# Patient Record
Sex: FEMALE | URBAN - METROPOLITAN AREA
[De-identification: names, ages, dates, MRNs, and addresses within clinical notes are randomized per-mention and may not be internally consistent; named-entity substitution may affect disease eponyms.]

---

## 2021-08-13 ENCOUNTER — HISTORICAL (OUTPATIENT)
Dept: ADMINISTRATIVE | Facility: HOSPITAL | Age: 66
End: 2021-08-13

## 2021-08-16 LAB — FINAL CULTURE: NORMAL

## 2021-08-19 LAB
FINAL CULTURE: NORMAL
FINAL CULTURE: NORMAL

## 2025-01-28 DIAGNOSIS — I73.9 PAD (PERIPHERAL ARTERY DISEASE): Primary | ICD-10-CM

## 2025-01-30 RX ORDER — NITROGLYCERIN 20 MG/G
OINTMENT TOPICAL
COMMUNITY
Start: 2025-01-27

## 2025-01-30 RX ORDER — CARVEDILOL 25 MG/1
25 TABLET ORAL EVERY 12 HOURS
COMMUNITY
Start: 2024-12-23

## 2025-01-30 RX ORDER — CHLORTHALIDONE 25 MG/1
50 TABLET ORAL
COMMUNITY
Start: 2024-12-23

## 2025-01-30 RX ORDER — LOSARTAN POTASSIUM 100 MG/1
100 TABLET ORAL
COMMUNITY
Start: 2024-12-23

## 2025-01-30 RX ORDER — FUROSEMIDE 20 MG/1
20 TABLET ORAL DAILY PRN
COMMUNITY
Start: 2024-08-28

## 2025-01-30 RX ORDER — ALBUTEROL SULFATE 0.83 MG/ML
SOLUTION RESPIRATORY (INHALATION)
COMMUNITY
Start: 2024-12-05

## 2025-01-30 RX ORDER — HYDROCODONE BITARTRATE AND ACETAMINOPHEN 5; 325 MG/1; MG/1
1 TABLET ORAL EVERY 8 HOURS PRN
COMMUNITY
Start: 2025-01-27

## 2025-01-30 RX ORDER — SULFAMETHOXAZOLE AND TRIMETHOPRIM 800; 160 MG/1; MG/1
1 TABLET ORAL EVERY 12 HOURS
COMMUNITY
Start: 2024-11-07

## 2025-01-30 RX ORDER — AMLODIPINE BESYLATE 10 MG/1
TABLET ORAL
COMMUNITY
Start: 2024-12-23

## 2025-01-30 RX ORDER — ATORVASTATIN CALCIUM 80 MG/1
80 TABLET, FILM COATED ORAL
COMMUNITY
Start: 2024-12-23

## 2025-02-10 NOTE — PROGRESS NOTES
[facility] V[facility] - Clinic Note  Andrea Henry MD      Patient Name: Cher Agrawal                   : 1955      MRN: [id number]   Visit Date: 2025       History Present Illness     Reason for Visit: Arterial Disease    Ms. Agrawal presents to the clinic for evaluation of bilateral lower extremity arterial disease.  She is a pleasant 70-year-old female referred over by Dr. Corona for discoloration and pain in her feet.  The she reports over the last 3 months she has had intermittent episodes of bluish to blackish discoloration of her toes with pain.  She has a began on the left and resolved.  She is now present on the right.  She has seen her cardiologist Dr. Humphrey and had ABIs that were normal.  He did give her some nitroglycerin paste, however this is not had any improvement.  She does smoke about a half a pack per day.  She is on aspirin and a statin.      REVIEW OF SYSTEMS:  12 point review of systems conducted, negative except as stated in the history of present illness. See HPI for details.        Physical Exam      Vitals:    25 1001 25 1005   BP: 129/78 (!) 159/85   BP Location: Right arm Left arm   Pulse:  92   Weight: 68 kg (150 lb)    Height: 5' (1.524 m)           General: well-nourished, no acute distress, and healthy appearing, alert, pleasant, conversant, and oriented  Neurologic: cranial nerves are grossly intact, no neurologic deficits, no motor deficits, and no sensory deficits  Neck/Chest: normal , soft without lymphadenopathy, and no carotid bruits noted  Respiratory: breathing easily and without respiratory distress  Cardiology: regular rate and rhythm    Upper Extremity Arterial Exam:   Right - brachial is palpable  Left - brachial is palpable    Lower Extremity Arterial Exam:  Right - posterior tibial is biphasic with doppler and dorsalis pedis is biphasic with doppler  Left - posterior tibial is triphasic with doppler and dorsalis pedis is triphasic with  doppler    Musculoskeletal:   Upper Extremity: normal bilateral hand function  Lower Extremity: no edema present to bilateral lower extremities     Skin:           Significant be discoloration of the right 1st and 2nd toes.  No open ulceration present.      Assessment and Plan     Ms. Agrawal is a 70 y.o. female with probable blue toe syndrome.  I did discuss with her the importance of smoking cessation.  I am concerned she may be embolizing from plaque more proximally since this is occurring bilaterally.  I would like to obtain a CTA of the abdomen and pelvis with runoff.  I also instructed her to begin taking Plavix.  She will return after her CTA.       1. PAD (peripheral artery disease)  - Ambulatory referral/consult to Vascular Surgery  - clopidogreL (PLAVIX) 75 mg tablet; Take 1 tablet (75 mg total) by mouth once daily.  Dispense: 30 tablet; Refill: 11    2. Smoker    3. 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      Medical History     Past Medical History:   Diagnosis Date    Heart attack     HLD (hyperlipidemia)     HTN (hypertension)     Stroke      Past Surgical History:   Procedure Laterality Date    ANKLE FRACTURE SURGERY Left     APPENDECTOMY      HYSTERECTOMY      INSERTION, STENT, ARTERY      cardiac stent    TONSILLECTOMY AND ADENOIDECTOMY       No family history on file.  Social History     Socioeconomic History    Marital status: Unknown   Tobacco Use    Smoking status: Every Day     Current packs/day: 0.25     Average packs/day: 0.3 packs/day for 40.0 years (10.0 ttl pk-yrs)     Types: Cigarettes     Start date: 2/12/1985    Smokeless tobacco: Never   Substance and Sexual Activity    Alcohol use: Never    Drug use: Never     Current Outpatient Medications   Medication Instructions    albuterol (PROVENTIL) 2.5 mg /3 mL (0.083 %) nebulizer solution USE 1 VIAL IN NEB EVERY 4  HOURS AS NEEDED    amLODIPine (NORVASC) 10 MG tablet Take by mouth.    atorvastatin (LIPITOR) 80 mg    carvediloL (COREG) 25 mg, Every 12 hours    chlorthalidone (HYGROTEN) 50 mg    clopidogreL (PLAVIX) 75 mg, Oral, Daily    furosemide (LASIX) 20 mg, Daily PRN    HYDROcodone-acetaminophen (NORCO) 5-325 mg per tablet 1 tablet, Every 8 hours PRN    losartan (COZAAR) 100 mg    NITRO-BID 2 % ointment Place onto the skin.    sulfamethoxazole-trimethoprim 800-160mg (BACTRIM DS) 800-160 mg Tab 1 tablet, Every 12 hours     Review of patient's allergies indicates:   Allergen Reactions    Gabapentin     Penicillins        Patient Care Team:  Kemal Corona DPM as Consulting Physician (Podiatry)        No follow-ups on file. In addition to their scheduled follow up, the patient has also been instructed to follow up on as needed basis.     Future Appointments   Date Time Provider Department Center   2/12/2025 11:20 AM Andrea Henry MD [facility]   3/5/2025 10:40 AM Andrea Henry MD [facility]

## 2025-02-12 ENCOUNTER — OFFICE VISIT (OUTPATIENT)
Dept: VASCULAR SURGERY | Facility: CLINIC | Age: 70
End: 2025-02-12
Payer: MEDICARE

## 2025-02-12 VITALS
BODY MASS INDEX: 29.45 KG/M2 | SYSTOLIC BLOOD PRESSURE: 159 MMHG | HEIGHT: 60 IN | WEIGHT: 150 LBS | HEART RATE: 92 BPM | DIASTOLIC BLOOD PRESSURE: 85 MMHG

## 2025-02-12 DIAGNOSIS — I73.9 PAD (PERIPHERAL ARTERY DISEASE): ICD-10-CM

## 2025-02-12 DIAGNOSIS — I75.023 BLUE TOE SYNDROME OF BOTH LOWER EXTREMITIES: ICD-10-CM

## 2025-02-12 DIAGNOSIS — F17.200 SMOKER: Primary | ICD-10-CM

## 2025-02-12 PROCEDURE — 3079F DIAST BP 80-89 MM HG: CPT | Mod: CPTII,,, | Performed by: SURGERY

## 2025-02-12 PROCEDURE — 3074F SYST BP LT 130 MM HG: CPT | Mod: CPTII,,, | Performed by: SURGERY

## 2025-02-12 PROCEDURE — 1101F PT FALLS ASSESS-DOCD LE1/YR: CPT | Mod: CPTII,,, | Performed by: SURGERY

## 2025-02-12 PROCEDURE — 1160F RVW MEDS BY RX/DR IN RCRD: CPT | Mod: CPTII,,, | Performed by: SURGERY

## 2025-02-12 PROCEDURE — 1159F MED LIST DOCD IN RCRD: CPT | Mod: CPTII,,, | Performed by: SURGERY

## 2025-02-12 PROCEDURE — 3288F FALL RISK ASSESSMENT DOCD: CPT | Mod: CPTII,,, | Performed by: SURGERY

## 2025-02-12 PROCEDURE — 3008F BODY MASS INDEX DOCD: CPT | Mod: CPTII,,, | Performed by: SURGERY

## 2025-02-12 PROCEDURE — 99204 OFFICE O/P NEW MOD 45 MIN: CPT | Mod: ,,, | Performed by: SURGERY

## 2025-02-12 PROCEDURE — 4010F ACE/ARB THERAPY RXD/TAKEN: CPT | Mod: CPTII,,, | Performed by: SURGERY

## 2025-02-12 RX ORDER — CLOPIDOGREL BISULFATE 75 MG/1
75 TABLET ORAL DAILY
Qty: 30 TABLET | Refills: 11 | Status: SHIPPED | OUTPATIENT
Start: 2025-02-12 | End: 2026-02-12

## 2025-02-13 DIAGNOSIS — I73.9 PERIPHERAL VASCULAR DISEASE, UNSPECIFIED: Primary | ICD-10-CM

## 2025-03-03 NOTE — PROGRESS NOTES
[facility] Vascular - Clinic Note  Andrea Henry MD      Patient Name: Cher Agrawal                   : 1955      MRN: [id number]   Visit Date: 3/5/2025       History Present Illness     Reason for Visit: Arterial Disease    Ms. Agrawal presents to the clinic to discuss results of CT scan. She reports that the wound to her right great toe has gotten worse. States that the pain to the toe has become unbearable. She is on plavix, aspirin, and statin.  She reports that she recently at her dog step on her to accident and she began to develop drainage afterwards.    REVIEW OF SYSTEMS:  12 point review of systems conducted, negative except as stated in the history of present illness. See HPI for details.        Physical Exam      Vitals:    25 1042 25 1043   BP: (!) 147/86 (!) 150/77   BP Location: Left arm Right arm   Patient Position: Sitting Sitting   Pulse: 73 72   Weight: 68 kg (150 lb)    Height: 5' (1.524 m)           General: well-nourished, no acute distress, and healthy appearing, alert, pleasant, conversant, and oriented  Neurologic: cranial nerves are grossly intact, no neurologic deficits, no motor deficits, and no sensory deficits  Neck/Chest: normal , soft without lymphadenopathy, and no carotid bruits noted  Respiratory: breathing easily, without respiratory distress, and normal breath sounds  Abdomen: normal and soft  Cardiology: regular rate and rhythm and no audible murmur    Upper Extremity Arterial Exam:   Right - radial is palpable and brachial is palpable  Left - radial is palpable and brachial is palpable    Lower Extremity Arterial Exam:  Right - posterior tibial is biphasic with doppler  Left - posterior tibial is biphasic with doppler    Musculoskeletal:   Upper Extremity: normal bilateral hand function  Lower Extremity: no edema present to bilateral lower extremities     Wound:   Location: right great toe  The distal tip of her right great toe is ulcerated.               Assessment and Plan     Ms. Agrawal is a 70 y.o. female with right lower extremity critical limb ischemia.  I did offer her angiography as treatment.  She has a short-segment occlusion of the SFA and I think this is amenable to endovascular therapy.  Isolated the procedure, risks, benefits to her.  We will also call her in some antibiotics until her procedure.    1. 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 Smoker    3. Critical limb ischemia of right lower extremity  - Basic Metabolic Panel; Future  - CBC Auto Differential; Future  - EKG 12-lead; Future  - X-Ray Chest PA And Lateral; Future  - sulfamethoxazole-trimethoprim 800-160mg (BACTRIM DS) 800-160 mg Tab; Take 1 tablet by mouth 2 (two) times daily. for 10 days  Dispense: 20 tablet; Refill: 0          Imaging Obtained/Reviewed   Study: CTA aorta/runoff  Date:   2/26/2025  This shows about a 5-6 cm occlusion of the distal right SFA and proximal popliteal with reconstitution.      Medical History     Past Medical History:   Diagnosis Date    Heart attack     HLD (hyperlipidemia)     HTN (hypertension)     Stroke      Past Surgical History:   Procedure Laterality Date    ANKLE FRACTURE SURGERY Left     APPENDECTOMY      HYSTERECTOMY      INSERTION, STENT, ARTERY      cardiac stent    TONSILLECTOMY AND ADENOIDECTOMY       No family history on file.  Social History[1]  Current Outpatient Medications   Medication Instructions    albuterol (PROVENTIL) 2.5 mg /3 mL (0.083 %) nebulizer solution USE 1 VIAL IN NEB EVERY 4 HOURS AS NEEDED    amLODIPine (NORVASC) 10 MG tablet Take by mouth.    atorvastatin (LIPITOR) 80 mg    benzonatate (TESSALON) 100 mg, 3 times daily PRN    carvediloL (COREG) 25 mg, Every 12 hours    chlorthalidone (HYGROTEN) 50 mg    clopidogreL (PLAVIX) 75 mg, Oral, Daily    furosemide (LASIX) 20 mg, Daily PRN    losartan (COZAAR) 100 mg    NITRO-BID 2 % ointment Place onto the skin.    promethazine (PHENERGAN) 25 mg, Every 4 hours PRN     sulfamethoxazole-trimethoprim 800-160mg (BACTRIM DS) 800-160 mg Tab 1 tablet, Oral, 2 times daily     Review of patient's allergies indicates:   Allergen Reactions    Gabapentin     Penicillins        Patient Care Team:  Cindy Fowler MD as PCP - General (Family Medicine)  Kemal Corona DPM as Consulting Physician (Podiatry)  Andrea Henry MD as Vascular Surgery (Vascular Surgery)        No follow-ups on file. In addition to their scheduled follow up, the patient has also been instructed to follow up on as needed basis.     Future Appointments   Date Time Provider Department Center   3/31/2025  9:00 AM Andrea Henry MD [facility]               [1]   Social History  Socioeconomic History    Marital status:    Tobacco Use    Smoking status: Every Day     Current packs/day: 0.25     Average packs/day: 0.2 packs/day for 40.1 years (10.0 ttl pk-yrs)     Types: Cigarettes     Start date: 2/12/1985    Smokeless tobacco: Never   Substance and Sexual Activity    Alcohol use: Never    Drug use: Never

## 2025-03-03 NOTE — H&P (VIEW-ONLY)
[facility] Vascular - Clinic Note  Andrea Henry MD      Patient Name: Cher Agrawal                   : 1955      MRN: [id number]   Visit Date: 3/5/2025       History Present Illness     Reason for Visit: Arterial Disease    Ms. Agrawal presents to the clinic to discuss results of CT scan. She reports that the wound to her right great toe has gotten worse. States that the pain to the toe has become unbearable. She is on plavix, aspirin, and statin.  She reports that she recently at her dog step on her to accident and she began to develop drainage afterwards.    REVIEW OF SYSTEMS:  12 point review of systems conducted, negative except as stated in the history of present illness. See HPI for details.        Physical Exam      Vitals:    25 1042 25 1043   BP: (!) 147/86 (!) 150/77   BP Location: Left arm Right arm   Patient Position: Sitting Sitting   Pulse: 73 72   Weight: 68 kg (150 lb)    Height: 5' (1.524 m)           General: well-nourished, no acute distress, and healthy appearing, alert, pleasant, conversant, and oriented  Neurologic: cranial nerves are grossly intact, no neurologic deficits, no motor deficits, and no sensory deficits  Neck/Chest: normal , soft without lymphadenopathy, and no carotid bruits noted  Respiratory: breathing easily, without respiratory distress, and normal breath sounds  Abdomen: normal and soft  Cardiology: regular rate and rhythm and no audible murmur    Upper Extremity Arterial Exam:   Right - radial is palpable and brachial is palpable  Left - radial is palpable and brachial is palpable    Lower Extremity Arterial Exam:  Right - posterior tibial is biphasic with doppler  Left - posterior tibial is biphasic with doppler    Musculoskeletal:   Upper Extremity: normal bilateral hand function  Lower Extremity: no edema present to bilateral lower extremities     Wound:   Location: right great toe  The distal tip of her right great toe is ulcerated.               Assessment and Plan     Ms. Agrawal is a 70 y.o. female with right lower extremity critical limb ischemia.  I did offer her angiography as treatment.  She has a short-segment occlusion of the SFA and I think this is amenable to endovascular therapy.  Isolated the procedure, risks, benefits to her.  We will also call her in some antibiotics until her procedure.    1. 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 Smoker    3. Critical limb ischemia of right lower extremity  - Basic Metabolic Panel; Future  - CBC Auto Differential; Future  - EKG 12-lead; Future  - X-Ray Chest PA And Lateral; Future  - sulfamethoxazole-trimethoprim 800-160mg (BACTRIM DS) 800-160 mg Tab; Take 1 tablet by mouth 2 (two) times daily. for 10 days  Dispense: 20 tablet; Refill: 0          Imaging Obtained/Reviewed   Study: CTA aorta/runoff  Date:   2/26/2025  This shows about a 5-6 cm occlusion of the distal right SFA and proximal popliteal with reconstitution.      Medical History     Past Medical History:   Diagnosis Date    Heart attack     HLD (hyperlipidemia)     HTN (hypertension)     Stroke      Past Surgical History:   Procedure Laterality Date    ANKLE FRACTURE SURGERY Left     APPENDECTOMY      HYSTERECTOMY      INSERTION, STENT, ARTERY      cardiac stent    TONSILLECTOMY AND ADENOIDECTOMY       No family history on file.  Social History[1]  Current Outpatient Medications   Medication Instructions    albuterol (PROVENTIL) 2.5 mg /3 mL (0.083 %) nebulizer solution USE 1 VIAL IN NEB EVERY 4 HOURS AS NEEDED    amLODIPine (NORVASC) 10 MG tablet Take by mouth.    atorvastatin (LIPITOR) 80 mg    benzonatate (TESSALON) 100 mg, 3 times daily PRN    carvediloL (COREG) 25 mg, Every 12 hours    chlorthalidone (HYGROTEN) 50 mg    clopidogreL (PLAVIX) 75 mg, Oral, Daily    furosemide (LASIX) 20 mg, Daily PRN    losartan (COZAAR) 100 mg    NITRO-BID 2 % ointment Place onto the skin.    promethazine (PHENERGAN) 25 mg, Every 4 hours PRN     sulfamethoxazole-trimethoprim 800-160mg (BACTRIM DS) 800-160 mg Tab 1 tablet, Oral, 2 times daily     Review of patient's allergies indicates:   Allergen Reactions    Gabapentin     Penicillins        Patient Care Team:  Cindy Fowler MD as PCP - General (Family Medicine)  Kemal Corona DPM as Consulting Physician (Podiatry)  Andrea Henry MD as Vascular Surgery (Vascular Surgery)        No follow-ups on file. In addition to their scheduled follow up, the patient has also been instructed to follow up on as needed basis.     Future Appointments   Date Time Provider Department Center   3/31/2025  9:00 AM Andrea Henry MD [facility]               [1]   Social History  Socioeconomic History    Marital status:    Tobacco Use    Smoking status: Every Day     Current packs/day: 0.25     Average packs/day: 0.2 packs/day for 40.1 years (10.0 ttl pk-yrs)     Types: Cigarettes     Start date: 2/12/1985    Smokeless tobacco: Never   Substance and Sexual Activity    Alcohol use: Never    Drug use: Never

## 2025-03-05 ENCOUNTER — LAB VISIT (OUTPATIENT)
Dept: LAB | Facility: HOSPITAL | Age: 70
End: 2025-03-05
Attending: SURGERY
Payer: MEDICARE

## 2025-03-05 ENCOUNTER — OFFICE VISIT (OUTPATIENT)
Dept: VASCULAR SURGERY | Facility: CLINIC | Age: 70
End: 2025-03-05
Payer: MEDICARE

## 2025-03-05 VITALS
HEART RATE: 72 BPM | HEIGHT: 60 IN | BODY MASS INDEX: 29.45 KG/M2 | DIASTOLIC BLOOD PRESSURE: 77 MMHG | SYSTOLIC BLOOD PRESSURE: 150 MMHG | WEIGHT: 150 LBS

## 2025-03-05 DIAGNOSIS — F17.200 SMOKER: ICD-10-CM

## 2025-03-05 DIAGNOSIS — I70.221 ATHEROSCLEROSIS OF NATIVE ARTERY OF RIGHT LOWER EXTREMITY WITH REST PAIN: ICD-10-CM

## 2025-03-05 DIAGNOSIS — I70.221 CRITICAL LIMB ISCHEMIA OF RIGHT LOWER EXTREMITY: Primary | ICD-10-CM

## 2025-03-05 DIAGNOSIS — I75.023 BLUE TOE SYNDROME OF BOTH LOWER EXTREMITIES: ICD-10-CM

## 2025-03-05 DIAGNOSIS — I70.221 CRITICAL LIMB ISCHEMIA OF RIGHT LOWER EXTREMITY: ICD-10-CM

## 2025-03-05 LAB
ANION GAP SERPL CALC-SCNC: 11 MEQ/L
BUN SERPL-MCNC: <15 MG/DL (ref 9.8–20.1)
CALCIUM SERPL-MCNC: 10.4 MG/DL (ref 8.4–10.2)
CHLORIDE SERPL-SCNC: 109 MMOL/L (ref 98–107)
CO2 SERPL-SCNC: 19 MMOL/L (ref 23–31)
CREAT SERPL-MCNC: 0.75 MG/DL (ref 0.55–1.02)
CREAT/UREA NIT SERPL: <20
GFR SERPLBLD CREATININE-BSD FMLA CKD-EPI: >60 ML/MIN/1.73/M2
GLUCOSE SERPL-MCNC: 95 MG/DL (ref 82–115)
POTASSIUM SERPL-SCNC: 3.3 MMOL/L (ref 3.5–5.1)
SODIUM SERPL-SCNC: 139 MMOL/L (ref 136–145)

## 2025-03-05 PROCEDURE — 36415 COLL VENOUS BLD VENIPUNCTURE: CPT

## 2025-03-05 PROCEDURE — 4010F ACE/ARB THERAPY RXD/TAKEN: CPT | Mod: CPTII,,, | Performed by: SURGERY

## 2025-03-05 PROCEDURE — 3077F SYST BP >= 140 MM HG: CPT | Mod: CPTII,,, | Performed by: SURGERY

## 2025-03-05 PROCEDURE — 1159F MED LIST DOCD IN RCRD: CPT | Mod: CPTII,,, | Performed by: SURGERY

## 2025-03-05 PROCEDURE — 3288F FALL RISK ASSESSMENT DOCD: CPT | Mod: CPTII,,, | Performed by: SURGERY

## 2025-03-05 PROCEDURE — 80048 BASIC METABOLIC PNL TOTAL CA: CPT

## 2025-03-05 PROCEDURE — 1160F RVW MEDS BY RX/DR IN RCRD: CPT | Mod: CPTII,,, | Performed by: SURGERY

## 2025-03-05 PROCEDURE — 3008F BODY MASS INDEX DOCD: CPT | Mod: CPTII,,, | Performed by: SURGERY

## 2025-03-05 PROCEDURE — 1101F PT FALLS ASSESS-DOCD LE1/YR: CPT | Mod: CPTII,,, | Performed by: SURGERY

## 2025-03-05 PROCEDURE — 99214 OFFICE O/P EST MOD 30 MIN: CPT | Mod: ,,, | Performed by: SURGERY

## 2025-03-05 PROCEDURE — 3078F DIAST BP <80 MM HG: CPT | Mod: CPTII,,, | Performed by: SURGERY

## 2025-03-05 RX ORDER — PROMETHAZINE HYDROCHLORIDE 25 MG/1
25 TABLET ORAL EVERY 4 HOURS PRN
COMMUNITY
Start: 2024-12-11

## 2025-03-05 RX ORDER — BENZONATATE 100 MG/1
100 CAPSULE ORAL 3 TIMES DAILY PRN
COMMUNITY
Start: 2024-12-11

## 2025-03-05 RX ORDER — SODIUM CHLORIDE 9 MG/ML
INJECTION, SOLUTION INTRAVENOUS CONTINUOUS
OUTPATIENT
Start: 2025-03-05

## 2025-03-05 RX ORDER — SULFAMETHOXAZOLE AND TRIMETHOPRIM 800; 160 MG/1; MG/1
1 TABLET ORAL 2 TIMES DAILY
Qty: 20 TABLET | Refills: 0 | Status: SHIPPED | OUTPATIENT
Start: 2025-03-05 | End: 2025-03-15

## 2025-03-10 PROCEDURE — 87205 SMEAR GRAM STAIN: CPT | Performed by: PODIATRIST

## 2025-03-11 ENCOUNTER — LAB REQUISITION (OUTPATIENT)
Dept: LAB | Facility: HOSPITAL | Age: 70
End: 2025-03-11
Payer: MEDICARE

## 2025-03-11 DIAGNOSIS — I75.021 ATHEROEMBOLISM OF RIGHT LOWER EXTREMITY: ICD-10-CM

## 2025-03-11 DIAGNOSIS — L03.031 CELLULITIS OF RIGHT TOE: ICD-10-CM

## 2025-03-11 DIAGNOSIS — M79.671 PAIN IN RIGHT FOOT: ICD-10-CM

## 2025-03-11 LAB
GRAM STN SPEC: NORMAL
GRAM STN SPEC: NORMAL

## 2025-03-11 PROCEDURE — 87075 CULTR BACTERIA EXCEPT BLOOD: CPT | Performed by: PODIATRIST

## 2025-03-11 PROCEDURE — 87070 CULTURE OTHR SPECIMN AEROBIC: CPT | Performed by: PODIATRIST

## 2025-03-14 ENCOUNTER — TELEPHONE (OUTPATIENT)
Dept: VASCULAR SURGERY | Facility: CLINIC | Age: 70
End: 2025-03-14
Payer: MEDICARE

## 2025-03-14 LAB — BACTERIA WND CULT: NORMAL

## 2025-03-15 LAB — BACTERIA SPEC ANAEROBE CULT: NORMAL

## 2025-03-17 ENCOUNTER — HOSPITAL ENCOUNTER (OUTPATIENT)
Facility: HOSPITAL | Age: 70
Discharge: HOME | End: 2025-03-17
Attending: SURGERY | Admitting: SURGERY
Payer: MEDICARE

## 2025-03-17 DIAGNOSIS — I70.221 CRITICAL LIMB ISCHEMIA OF RIGHT LOWER EXTREMITY: ICD-10-CM

## 2025-03-17 DIAGNOSIS — I70.221 ATHEROSCLEROSIS OF NATIVE ARTERY OF RIGHT LOWER EXTREMITY WITH REST PAIN: ICD-10-CM

## 2025-03-17 PROCEDURE — 99152 MOD SED SAME PHYS/QHP 5/>YRS: CPT | Mod: ,,, | Performed by: SURGERY

## 2025-03-17 PROCEDURE — C1874 STENT, COATED/COV W/DEL SYS: HCPCS | Performed by: SURGERY

## 2025-03-17 PROCEDURE — C1887 CATHETER, GUIDING: HCPCS | Performed by: SURGERY

## 2025-03-17 PROCEDURE — 99153 MOD SED SAME PHYS/QHP EA: CPT | Performed by: SURGERY

## 2025-03-17 PROCEDURE — C1760 CLOSURE DEV, VASC: HCPCS | Performed by: SURGERY

## 2025-03-17 PROCEDURE — C1769 GUIDE WIRE: HCPCS | Performed by: SURGERY

## 2025-03-17 PROCEDURE — 99152 MOD SED SAME PHYS/QHP 5/>YRS: CPT | Performed by: SURGERY

## 2025-03-17 PROCEDURE — 63600175 PHARM REV CODE 636 W HCPCS: Performed by: SURGERY

## 2025-03-17 PROCEDURE — 75710 ARTERY X-RAYS ARM/LEG: CPT | Mod: 26,XU,RT, | Performed by: SURGERY

## 2025-03-17 PROCEDURE — 75710 ARTERY X-RAYS ARM/LEG: CPT | Mod: XU,RT | Performed by: SURGERY

## 2025-03-17 PROCEDURE — C1894 INTRO/SHEATH, NON-LASER: HCPCS | Performed by: SURGERY

## 2025-03-17 PROCEDURE — C1725 CATH, TRANSLUMIN NON-LASER: HCPCS | Performed by: SURGERY

## 2025-03-17 PROCEDURE — 37226 PR FEM/POPL REVASC W/STENT: CPT | Mod: RT,,, | Performed by: SURGERY

## 2025-03-17 PROCEDURE — 37226 HC FEM/POPL REVASC W/STENT: CPT | Mod: RT | Performed by: SURGERY

## 2025-03-17 DEVICE — DRUG-ELUTING VASCULAR STENT SYSTEM
Type: IMPLANTABLE DEVICE | Site: LEG | Status: FUNCTIONAL
Brand: ELUVIA™

## 2025-03-17 DEVICE — ANGIO-SEAL VIP VASCULAR CLOSURE DEVICE
Type: IMPLANTABLE DEVICE | Site: GROIN | Status: FUNCTIONAL
Brand: ANGIO-SEAL

## 2025-03-17 RX ORDER — MIDAZOLAM HYDROCHLORIDE 1 MG/ML
INJECTION INTRAMUSCULAR; INTRAVENOUS
Status: DISCONTINUED | OUTPATIENT
Start: 2025-03-17 | End: 2025-03-17 | Stop reason: HOSPADM

## 2025-03-17 RX ORDER — PROTAMINE SULFATE 10 MG/ML
INJECTION, SOLUTION INTRAVENOUS
Status: DISCONTINUED | OUTPATIENT
Start: 2025-03-17 | End: 2025-03-17 | Stop reason: HOSPADM

## 2025-03-17 RX ORDER — HEPARIN SODIUM 1000 [USP'U]/ML
INJECTION, SOLUTION INTRAVENOUS; SUBCUTANEOUS
Status: DISCONTINUED | OUTPATIENT
Start: 2025-03-17 | End: 2025-03-17 | Stop reason: HOSPADM

## 2025-03-17 RX ORDER — FENTANYL CITRATE 50 UG/ML
INJECTION, SOLUTION INTRAMUSCULAR; INTRAVENOUS
Status: DISCONTINUED | OUTPATIENT
Start: 2025-03-17 | End: 2025-03-17 | Stop reason: HOSPADM

## 2025-03-17 RX ORDER — ONDANSETRON 4 MG/1
8 TABLET, ORALLY DISINTEGRATING ORAL EVERY 8 HOURS PRN
Status: DISCONTINUED | OUTPATIENT
Start: 2025-03-17 | End: 2025-03-17 | Stop reason: HOSPADM

## 2025-03-17 RX ORDER — SODIUM CHLORIDE 9 MG/ML
INJECTION, SOLUTION INTRAVENOUS CONTINUOUS
Status: DISCONTINUED | OUTPATIENT
Start: 2025-03-17 | End: 2025-03-17 | Stop reason: HOSPADM

## 2025-03-17 RX ORDER — LIDOCAINE HYDROCHLORIDE 10 MG/ML
INJECTION, SOLUTION INFILTRATION; PERINEURAL
Status: DISCONTINUED | OUTPATIENT
Start: 2025-03-17 | End: 2025-03-17 | Stop reason: HOSPADM

## 2025-03-17 RX ORDER — ACETAMINOPHEN 325 MG/1
650 TABLET ORAL EVERY 4 HOURS PRN
Status: DISCONTINUED | OUTPATIENT
Start: 2025-03-17 | End: 2025-03-17 | Stop reason: HOSPADM

## 2025-03-17 NOTE — Clinical Note
An angiography was performed of the proximal right popliteal artery, infrapopliteal artery, anterior tibial artery, tibio-peroneal trunk, peroneal artery and posterior tibial artery via power injection.

## 2025-03-17 NOTE — Clinical Note
An angiography was performed of the proximal right superficial femoral artery via power injection. The following changes were made to your medications today:   Continue all your current medications.    The following lifestyle modifications are encouraged:  Continue regular exercise at least 30 minutes daily.  Lowfat/Low Cholesterol diet advised.   Low sodium diet <2000mgs/day and fluid restrictions of <2liters/day (640z/day or 6-8 cups 8oz liquids per day).  Daily weight checks and call if you notice weight gain of 3lbs in a day or 5lbs in a week.    The following tests have been order:  None today     Follow up with primary cardiologist,   in July.    Additional Educational Resources:  For additional resources regarding your symptoms, diagnosis, or further health information, please visit the Health Resources section on [email or web address] or the Online Health Resources section in [employer] ([employer]

## 2025-03-17 NOTE — Clinical Note
Manual pressure was applied to the left femoral artery sheath insertion site. 10 minutes post angio-seal placement

## 2025-03-17 NOTE — OP NOTE
[facility] [place]    Patient: Cher Agrawal    Date of Procedure: 03/17/2025    Preop Diagnosis:  Left lower extremity critical limb ischemia    Postop Diagnosis:  Same    Procedure:  1.  Moderate sedation directly supervised by me for 43 minutes   2.  Ultrasound-guided access of the left common femoral artery   3. Right SFA popliteal intravascular lithotripsy with drug coated stent placement using a 6 mm x 100 mm Neha drug coated stent    Surgeon: Andrea Henry    Indication for Procedure: Cher Agrawal is a 70 y.o. female  who presented ulcer on her right great toe.  She was found to have an distal SFA and proximal popliteal occlusion.  She was taken for revascularization.    Anesthesia:  Moderate sedation directly supervised by me and 1% lidocaine.  An independent observer was used to monitor cardiorespiratory function throughout the procedure.  Please see the procedure logs for timing and dosing of sedation medications as well as the name of the there was providing sedation.    Blood Loss:  5 mL    Findings:  Ultrasound evaluation of the left common femoral artery noted it to be patent but had your wall calcific disease.  Initial angiography noted the right common iliac, internal iliac, and external iliac arteries to be patent.  The common femoral is patent.  The profunda is patent.  The proximal SFA is patent.  The distal SFA extending into the proximal popliteal was occluded for about a 6 cm segment.  Beyond this the popliteal was patent onto three-vessel runoff which is patent to the foot.  On completion there was excellent flow through the area with no residual stenosis.    Implants:  6 mm x 100 mm Neha drug coated stent    Closure: Angioseal    Procedure in Detail:  After informed consent was obtained, and risks and benefits discussed with the patient, she was brought to the cath lab and placed supine.  For adequate sedation was obtained, she was prepped and draped in a standard  sterile fashion.  Ultrasound-guided access of the left common femoral artery was obtained.  This was done with a micropuncture set after instillation of local anesthetic.  I exchanged out for an 035 wire and placed a 5 Welsh sheath.  Glidewire advantage and Omni flush catheter were used to access the aorta.  Angiography was performed and I selected out the right common iliac artery.  I then advanced my wire down to the common femoral artery and followed this with the catheter.  Angiography of the right lower extremity was then performed.  I then replaced the wire into the proximal SFA.  I exchanged out for a 6 Welsh by 45 cm sheath which I placed in the proximal SFA.  The patient was systemically heparinized.  I then used an 035 glidewire advantage and a Navicross catheter to traverse the lesion.  I then exchanged out for an 014 glide is wire.  A 5 mm shockwave lithotripsy balloon was advanced across the lesion.  I did an inflation at 2 atmospheres with intravascular lithotripsy.  The balloon was then deflated and reinflated to 4 atmospheres from the initial round of lithotripsy.  I did make 1 more proximal treatment at 4 atmospheres.  At this point completion angiography showed residual dissection flap in the proximal portion of the lesion.  I then placed a 6 mm x 100 mm drug coated stent across the lesion and deployed it.  This was post dilated to 5 mm completion angiography showed excellent flow through the area and good flow all the way down into the foot.  The wire was removed and replaced an 035 wire.  The sheath was removed and an Angio-Seal device was deployed.  The patient was transferred back to her room in stable condition.    Complications: None    Specimens: None

## 2025-03-17 NOTE — Clinical Note
An angiography was performed of the ostial right popliteal artery, infrapopliteal artery, anterior tibial artery, tibio-peroneal trunk, peroneal artery and posterior tibial artery via power injection.

## 2025-03-17 NOTE — Clinical Note
The wire was inserted into the ostial left common femoral artery. Introducer inserted for sheath then removed.

## 2025-03-17 NOTE — DISCHARGE INSTRUCTIONS
-Remove dressing in 24hrs  -May shower after dressing removed, wash with soap and water only  -No driving for two Days  -Do not lift anything heavier than a gallon of milk for 5 days  -No tub bathing for 5 days  -No ointment, lotions, powders, creams around site for 5 days  - Return to the nearest emergency room if you start running a fever; have any kind of discharge coming from the site, the site looks red or swollen.  - If site starts to bleed, lay flat on the ground, apply pressure to the site and call 911.

## 2025-03-18 DIAGNOSIS — I70.221 CRITICAL LIMB ISCHEMIA OF RIGHT LOWER EXTREMITY: Primary | ICD-10-CM

## 2025-03-18 RX ORDER — HYDROCODONE BITARTRATE AND ACETAMINOPHEN 5; 325 MG/1; MG/1
1 TABLET ORAL EVERY 6 HOURS PRN
Qty: 15 TABLET | Refills: 0 | Status: SHIPPED | OUTPATIENT
Start: 2025-03-18

## 2025-03-19 VITALS
SYSTOLIC BLOOD PRESSURE: 125 MMHG | HEART RATE: 59 BPM | WEIGHT: 164.69 LBS | RESPIRATION RATE: 18 BRPM | BODY MASS INDEX: 32.33 KG/M2 | OXYGEN SATURATION: 96 % | DIASTOLIC BLOOD PRESSURE: 72 MMHG | TEMPERATURE: 98 F | HEIGHT: 60 IN

## 2025-03-19 NOTE — DISCHARGE SUMMARY
[facility] - Cath Lab Services  Discharge Note  Short Stay    Procedure(s) (LRB):  Angiogram Extremity Unilateral (Right)      OUTCOME: Patient tolerated treatment/procedure well without complication and is now ready for discharge.    DISPOSITION: Home or Self Care    FINAL DIAGNOSIS:  Critical limb ischemia of right lower extremity    FOLLOWUP: In clinic    DISCHARGE INSTRUCTIONS:    Discharge Procedure Orders   Diet general     No dressing needed     Activity as tolerated        TIME SPENT ON DISCHARGE:  minutes

## 2025-03-31 NOTE — PROGRESS NOTES
[facility] Vascular - Clinic Note  Andrea Henry MD      Patient Name: Cher Agrawal                   : 1955      MRN: [id number]   Visit Date: 2025       History Present Illness     Reason for Visit: Post-operative Follow up     Ms. Agrawal presents to the clinic for a 2 week follow up s/p right superficial femoral artery and popliteal lithotripsy and drug-coated stent placement. She reports she is still having intermittent shooting pains to her right foot however this has significantly improved since surgery. She denies any continued pain to her right big toe. She was able to start gardening again. She is scheduled to see Dr. Corona with podiatry next week.        REVIEW OF SYSTEMS:  12 point review of systems conducted, negative except as stated in the history of present illness. See HPI for details.        Physical Exam      Vitals:    25 0949 25 0951   BP: (!) 142/83 127/79   BP Location: Left arm Right arm   Patient Position: Sitting Sitting   Pulse: 74 72   Weight: 68 kg (150 lb)    Height: 5' (1.524 m)           General: well-nourished, no acute distress, and healthy appearing, alert, pleasant, conversant, and oriented  Respiratory: breathing easily and without respiratory distress  Cardiology: regular rate and rhythm    Upper Extremity Arterial Exam:   Right - radial is palpable and brachial is palpable  Left - radial is palpable and brachial is palpable    Lower Extremity Arterial Exam:  Right - dorsalis pedis is palpable    Musculoskeletal:   Lower Extremity: no edema present to bilateral lower extremities     Wound:   Location: right great toe  Great toe ulcer dry with eschar present.  No signs of infection.                  Assessment and Plan     Ms. Agrawal is a 70 y.o. female with right lower extremity critical limb ischemia status post SFA lithotripsy with drug coated stent placement.  Her wound is healing well.  She has a palpable pulse.  She is on appropriate medical  therapy with Plavix and a statin.  I did discuss smoking cessation with her.  She is down to about 3 cigarettes per week.  I would like her to pain her toe with Betadine and so she can follow up with Podiatry.  She states she is currently using Epsom salt and peroxide.  I would like her to return in about a month for a 6 week postop MILTON.  I would then like to see her back in about 3 months with an MILTON and a right lower extremity duplex.      1. Critical limb ischemia of right lower extremity  - CV Ankle Brachial Indices (MILTON); Future  - CV Ankle Brachial Indices (MILTON); Future  - CV Ultrasound doppler arterial leg right; Future    2. Smoker            Imaging Obtained/Reviewed   Study: none  Date:           Medical History     Past Medical History:   Diagnosis Date    Heart attack     HLD (hyperlipidemia)     HTN (hypertension)     Stroke      Past Surgical History:   Procedure Laterality Date    ANGIOGRAPHY OF LOWER EXTREMITY Right 3/17/2025    Procedure: Angiogram Extremity Unilateral;  Surgeon: Andrea Henry MD;  Location: [facility] CATH LAB;  Service: Peripheral Vascular;  Laterality: Right;  via left groin access    ANKLE FRACTURE SURGERY Left     APPENDECTOMY      HYSTERECTOMY      INSERTION, STENT, ARTERY      cardiac stent    TONSILLECTOMY AND ADENOIDECTOMY       No family history on file.  Social History[1]  Current Outpatient Medications   Medication Instructions    albuterol (PROVENTIL) 2.5 mg /3 mL (0.083 %) nebulizer solution USE 1 VIAL IN NEB EVERY 4 HOURS AS NEEDED    amLODIPine (NORVASC) 10 mg, Daily    atorvastatin (LIPITOR) 80 mg    benzonatate (TESSALON) 100 mg, 3 times daily PRN    carvediloL (COREG) 25 mg, Every 12 hours    chlorthalidone (HYGROTEN) 50 mg, Daily    clopidogreL (PLAVIX) 75 mg, Oral, Daily    furosemide (LASIX) 20 mg, Daily PRN    HYDROcodone-acetaminophen (NORCO) 5-325 mg per tablet 1 tablet, Oral, Every 6 hours PRN    losartan (COZAAR) 100 mg    promethazine (PHENERGAN) 25 mg,  Every 4 hours PRN     Review of patient's allergies indicates:   Allergen Reactions    Terazosin Nausea And Vomiting    Gabapentin     Penicillins     Tramadol hcl Other (See Comments)       Patient Care Team:  Cindy Fowler MD as PCP - General (Family Medicine)  Kemal Corona DPM as Consulting Physician (Podiatry)  Andrea Herny MD as Vascular Surgery (Vascular Surgery)        No follow-ups on file. In addition to their scheduled follow up, the patient has also been instructed to follow up on as needed basis.     Future Appointments   Date Time Provider Department Center   [facility]2025 10[facility]   7/2/2025  9:00 [facility]   7/2/2025  9:30 [facility]   7/2/2025 10:00 AM Andrea Henry MD [facility]               [1]   Social History  Socioeconomic History    Marital status:    Tobacco Use    Smoking status: Every Day     Current packs/day: 0.25     Average packs/day: 0.3 packs/day for 40.1 years (10.0 ttl pk-yrs)     Types: Cigarettes     Start date: 2/12/1985    Smokeless tobacco: Never   Substance and Sexual Activity    Alcohol use: Never    Drug use: Never

## 2025-04-02 ENCOUNTER — OFFICE VISIT (OUTPATIENT)
Dept: VASCULAR SURGERY | Facility: CLINIC | Age: 70
End: 2025-04-02
Payer: MEDICARE

## 2025-04-02 VITALS
HEIGHT: 60 IN | DIASTOLIC BLOOD PRESSURE: 79 MMHG | HEART RATE: 72 BPM | SYSTOLIC BLOOD PRESSURE: 127 MMHG | BODY MASS INDEX: 29.45 KG/M2 | WEIGHT: 150 LBS

## 2025-04-02 DIAGNOSIS — I70.221 CRITICAL LIMB ISCHEMIA OF RIGHT LOWER EXTREMITY: Primary | ICD-10-CM

## 2025-04-02 DIAGNOSIS — F17.200 SMOKER: ICD-10-CM

## 2025-04-02 PROCEDURE — 99213 OFFICE O/P EST LOW 20 MIN: CPT | Mod: ,,, | Performed by: SURGERY

## 2025-04-02 PROCEDURE — 3288F FALL RISK ASSESSMENT DOCD: CPT | Mod: CPTII,,, | Performed by: SURGERY

## 2025-04-02 PROCEDURE — 4010F ACE/ARB THERAPY RXD/TAKEN: CPT | Mod: CPTII,,, | Performed by: SURGERY

## 2025-04-02 PROCEDURE — 3078F DIAST BP <80 MM HG: CPT | Mod: CPTII,,, | Performed by: SURGERY

## 2025-04-02 PROCEDURE — 3074F SYST BP LT 130 MM HG: CPT | Mod: CPTII,,, | Performed by: SURGERY

## 2025-04-02 PROCEDURE — 1159F MED LIST DOCD IN RCRD: CPT | Mod: CPTII,,, | Performed by: SURGERY

## 2025-04-02 PROCEDURE — 3008F BODY MASS INDEX DOCD: CPT | Mod: CPTII,,, | Performed by: SURGERY

## 2025-04-02 PROCEDURE — 1101F PT FALLS ASSESS-DOCD LE1/YR: CPT | Mod: CPTII,,, | Performed by: SURGERY

## 2025-04-02 PROCEDURE — 1160F RVW MEDS BY RX/DR IN RCRD: CPT | Mod: CPTII,,, | Performed by: SURGERY

## 2025-07-01 NOTE — PROGRESS NOTES
[facility] Vascular - Clinic Note  Andrea Henry MD      Patient Name: Cher Agrawal                   : 1955      MRN: [id number]   Visit Date: 2025       History Present Illness     Reason for Visit: Arterial Disease    Ms. Agrawal presents to the clinic for 3 month follow up of right lower extremity arterial disease. She is s/p right superficial femoral artery and popliteal lithotripsy and drug-coated stent placement for right great toe wound. She reports wound has completely healed. She is ambulating without any claudication symptoms. She does occasionally get a sharp pain to the leg but this is  infrequent.  She has cut down to 2 cigarettes a week.      REVIEW OF SYSTEMS:  12 point review of systems conducted, negative except as stated in the history of present illness. See HPI for details.        Physical Exam      Vitals:    25 1010 25 1012   BP: (!) 179/72 (!) 161/80   BP Location: Left arm Right arm   Patient Position: Sitting Sitting   Pulse: 68 71   Weight: 62.6 kg (138 lb)    Height: 5' (1.524 m)           General: well-nourished, no acute distress, and healthy appearing, alert, pleasant, conversant, and oriented  Neurologic: cranial nerves are grossly intact, no neurologic deficits, no motor deficits, and no sensory deficits  Neck/Chest: normal , soft without lymphadenopathy, and no carotid bruits noted  Respiratory: breathing easily, without respiratory distress, and normal breath sounds  Abdomen: normal and soft  Cardiology: regular rate and rhythm and no audible murmur    Upper Extremity Arterial Exam:   Right - radial is palpable and brachial is palpable  Left - radial is palpable and brachial is palpable    Lower Extremity Arterial Exam:  Right - posterior tibial is non-palpable and dorsalis pedis is non-palpable  Left - posterior tibial is non-palpable and dorsalis pedis is non-palpable    Musculoskeletal:   Upper Extremity: normal bilateral hand function  Lower  Extremity: no edema present to bilateral lower extremities             Assessment and Plan     Ms. Agrawal is a 70 y.o. with history of critical limb ischemia status post revascularization.  Her wound is almost completely healed.  At this point she is doing exceptionally well.  I do recommend she continue her Plavix and that she add a daily 81 mg aspirin.  We again had a discussion regarding smoking cessation and we discussed that she needs to quit.  She is only smoking a couple of cigarettes per week.  Her ABIs show good flow and there is no restenosis and her stent.  I would like to see her back in about 6 months with a repeat MILTON and right lower extremity duplex at that point.        1. Critical limb ischemia of right lower extremity    2. Atherosclerosis of native artery of right lower extremity, with unspecified presence of clinical manifestation  - Ankle Brachial Indices (MILTON); Future  - CV Ultrasound doppler arterial leg right; Future    3. Smoker          Imaging Obtained/Reviewed   Study: ankle brachial index and right lower extremity arterial duplex  Date:   7/2/25  ABIs were performed today.  This shows a value of 0.89 on the right 0.86 on the left.  Duplex of the right lower extremity shows a widely patent SFA popliteal along with a patent SFA stent      Medical History     Past Medical History:   Diagnosis Date    Heart attack     HLD (hyperlipidemia)     HTN (hypertension)     PAD (peripheral artery disease)     Stroke      Past Surgical History:   Procedure Laterality Date    ANGIOGRAPHY OF LOWER EXTREMITY Right 3/17/2025    Procedure: Angiogram Extremity Unilateral;  Surgeon: Andrea Henry MD;  Location: [facility] CATH LAB;  Service: Peripheral Vascular;  Laterality: Right;  via left groin access    ANKLE FRACTURE SURGERY Left     APPENDECTOMY      HYSTERECTOMY      INSERTION, STENT, ARTERY      cardiac stent    TONSILLECTOMY AND ADENOIDECTOMY       No family history on file.  Social  History[1]  Current Outpatient Medications   Medication Instructions    albuterol (PROVENTIL) 2.5 mg /3 mL (0.083 %) nebulizer solution USE 1 VIAL IN NEB EVERY 4 HOURS AS NEEDED    amLODIPine (NORVASC) 10 mg, Daily    atorvastatin (LIPITOR) 80 mg    benzonatate (TESSALON) 100 mg, 3 times daily PRN    carvediloL (COREG) 25 mg, Every 12 hours    chlorthalidone (HYGROTEN) 50 mg, Daily    clopidogreL (PLAVIX) 75 mg, Oral, Daily    furosemide (LASIX) 20 mg, Daily PRN    HYDROcodone-acetaminophen (NORCO) 5-325 mg per tablet 1 tablet, Oral, Every 6 hours PRN    losartan (COZAAR) 100 mg    promethazine (PHENERGAN) 25 mg, Every 4 hours PRN     Review of patient's allergies indicates:   Allergen Reactions    Gabapentin      Other Reaction(s): Drowsiness, Unknown    Penicillins Anaphylaxis, Shortness Of Breath and Swelling     Other Reaction(s): Unknown    Terazosin Nausea And Vomiting    Tramadol hcl Other (See Comments)       Patient Care Team:  Cindy Fowler MD as PCP - General (Family Medicine)  Kemal Corona DPM as Consulting Physician (Podiatry)  Andrea Henry MD as Vascular Surgery (Vascular Surgery)  Gio Humphrey Jr., MD as Consulting Physician (Cardiology)        No follow-ups on file. In addition to their scheduled follow up, the patient has also been instructed to follow up on as needed basis.     Future Appointments   Date Time Provider Department Center   1/7/2026  8:30 AM CV [facility]   1/7/2026  9:00 AM CV [facility]ULAR [facility]   1/7/2026  9:40 AM Andrea Henry MD [facility]               [1]   Social History  Socioeconomic History    Marital status:    Tobacco Use    Smoking status: Every Day     Current packs/day: 0.25     Average packs/day: 0.3 packs/day for 40.4 years (10.1 ttl pk-yrs)     Types: Cigarettes     Start date: 2/12/1985    Smokeless tobacco: Never   Substance and Sexual Activity    Alcohol use:  Never    Drug use: Never

## 2025-07-02 ENCOUNTER — OFFICE VISIT (OUTPATIENT)
Dept: VASCULAR SURGERY | Facility: CLINIC | Age: 70
End: 2025-07-02
Attending: SURGERY
Payer: MEDICARE

## 2025-07-02 VITALS
WEIGHT: 138 LBS | HEIGHT: 60 IN | BODY MASS INDEX: 27.09 KG/M2 | HEART RATE: 71 BPM | DIASTOLIC BLOOD PRESSURE: 80 MMHG | SYSTOLIC BLOOD PRESSURE: 161 MMHG

## 2025-07-02 DIAGNOSIS — I70.221 CRITICAL LIMB ISCHEMIA OF RIGHT LOWER EXTREMITY: Primary | ICD-10-CM

## 2025-07-02 DIAGNOSIS — F17.200 SMOKER: ICD-10-CM

## 2025-07-02 DIAGNOSIS — I70.201 ATHEROSCLEROSIS OF NATIVE ARTERY OF RIGHT LOWER EXTREMITY, WITH UNSPECIFIED PRESENCE OF CLINICAL MANIFESTATION: ICD-10-CM

## (undated) DEVICE — KIT MINI STK MAX COAX 5FR 10CM

## (undated) DEVICE — Device

## (undated) DEVICE — GUIDEWIRE STF .035X260CM ANG

## (undated) DEVICE — CANNULA ADULT NASAL 7FT

## (undated) DEVICE — GUIDEWIRE INQWIRE SE 3MM JTIP

## (undated) DEVICE — SET ANGIO ACIST CVI ANGIOTOUCH

## (undated) DEVICE — SHEATH DESTINATION 6F X 45CM

## (undated) DEVICE — PAD DEFIB CADENCE ADULT R2

## (undated) DEVICE — CATH SHOCKWAVE M5+ IVL 5.0X60M

## (undated) DEVICE — SLEEVE SHOCKWAVE CABLE 5X96IN

## (undated) DEVICE — GUIDEWIRE GLADIUS STR 300CM

## (undated) DEVICE — CATH NAVICROSS .035X135CM STR

## (undated) DEVICE — SHEATH INTRODUCER 5FR 10CM

## (undated) DEVICE — CATH 4 FR OMNI FLUSH 65CM